# Patient Record
Sex: MALE | Race: WHITE
[De-identification: names, ages, dates, MRNs, and addresses within clinical notes are randomized per-mention and may not be internally consistent; named-entity substitution may affect disease eponyms.]

---

## 2019-01-29 ENCOUNTER — HOSPITAL ENCOUNTER (OUTPATIENT)
Dept: HOSPITAL 53 - M RAD | Age: 49
End: 2019-01-29
Attending: OPHTHALMOLOGY
Payer: COMMERCIAL

## 2019-01-29 DIAGNOSIS — H57.02: Primary | ICD-10-CM

## 2019-01-29 PROCEDURE — 70553 MRI BRAIN STEM W/O & W/DYE: CPT

## 2019-01-30 NOTE — REP
MRI brain without and with IV contrast:

 

History:  Anisocoria.  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

myosis.

 

Comparison study: No comparison study.

 

Technique:  Axial and sagittal imaging planes are utilized for T1 and T2-weighted

scans.  Sequences include spin-echo, fast spin echo, FLAIR, and diffusion

weighted sequences. Gadolinium enhancement dose is 19 ml of intravenous

ProHance.

 

MRI findings:  No bony calvarial lesion is seen.  Craniocervical junction and

upper cervical cord are normal in appearance.  There is no MR evidence of

significant paranasal sinus disease.  No intraorbital abnormality is seen.

 

The lateral, third, and fourth ventricles are normal in size and position.

Gray-white differentiation pattern is intact above and below the tentorium.

There is no evidence of intracranial hemorrhage.  No mass, infarction,

extra-axial fluid collection or midline shift is seen.  No abnormal white matter

lesion is seen. No abnormal intracranial gadolinium enhancement.

 

Impression:

 

Negative brain MRI study.

 

 

Electronically Signed by

Rafael Reynolds MD 01/30/2019 08:07 A

## 2020-12-07 ENCOUNTER — HOSPITAL ENCOUNTER (OUTPATIENT)
Dept: HOSPITAL 53 - M LABSMTC | Age: 50
End: 2020-12-07
Attending: PEDIATRICS
Payer: SELF-PAY

## 2020-12-07 DIAGNOSIS — Z11.59: Primary | ICD-10-CM
